# Patient Record
Sex: FEMALE | URBAN - METROPOLITAN AREA
[De-identification: names, ages, dates, MRNs, and addresses within clinical notes are randomized per-mention and may not be internally consistent; named-entity substitution may affect disease eponyms.]

---

## 2023-11-15 ENCOUNTER — APPOINTMENT (OUTPATIENT)
Dept: URBAN - METROPOLITAN AREA CLINIC 306 | Age: 68
Setting detail: DERMATOLOGY
End: 2023-11-15

## 2023-11-15 DIAGNOSIS — L20.89 OTHER ATOPIC DERMATITIS: ICD-10-CM

## 2023-11-15 DIAGNOSIS — L72.8 OTHER FOLLICULAR CYSTS OF THE SKIN AND SUBCUTANEOUS TISSUE: ICD-10-CM

## 2023-11-15 PROCEDURE — OTHER PRESCRIPTION MEDICATION MANAGEMENT: OTHER

## 2023-11-15 PROCEDURE — OTHER GENTLE SKIN CARE INSTRUCTIONS: OTHER

## 2023-11-15 PROCEDURE — OTHER COUNSELING: OTHER

## 2023-11-15 PROCEDURE — OTHER DEFER: OTHER

## 2023-11-15 PROCEDURE — OTHER PRESCRIPTION: OTHER

## 2023-11-15 PROCEDURE — OTHER MIPS QUALITY: OTHER

## 2023-11-15 PROCEDURE — 99204 OFFICE O/P NEW MOD 45 MIN: CPT

## 2023-11-15 RX ORDER — TRIAMCINOLONE ACETONIDE 1 MG/G
CREAM TOPICAL
Qty: 80 | Refills: 6 | Status: ERX | COMMUNITY
Start: 2023-11-15

## 2023-11-15 ASSESSMENT — LOCATION ZONE DERM
LOCATION ZONE: TRUNK
LOCATION ZONE: LEG

## 2023-11-15 ASSESSMENT — LOCATION DETAILED DESCRIPTION DERM
LOCATION DETAILED: LEFT DISTAL PRETIBIAL REGION
LOCATION DETAILED: SUBXIPHOID

## 2023-11-15 ASSESSMENT — LOCATION SIMPLE DESCRIPTION DERM
LOCATION SIMPLE: LEFT PRETIBIAL REGION
LOCATION SIMPLE: ABDOMEN

## 2023-11-15 NOTE — HPI: RASH
What Type Of Note Output Would You Prefer (Optional)?: Standard Output
Is The Patient Presenting As Previously Scheduled?: Yes
How Severe Is Your Rash?: mild
Is This A New Presentation, Or A Follow-Up?: Rash
Additional History: Used fluorouracil on some spots in that location

## 2023-11-15 NOTE — PROCEDURE: DEFER
Introduction Text (Please End With A Colon): The following procedure was deferred:excision
X Size Of Lesion In Cm (Optional): 0
Detail Level: Detailed

## 2023-11-15 NOTE — PROCEDURE: PRESCRIPTION MEDICATION MANAGEMENT
Detail Level: Zone
Initiate Treatment: triamcinolone acetonide 0.1 % topical cream \\nSig: Apply to affected area (eczema)twice daily prn itch
Render In Strict Bullet Format?: No

## 2023-11-15 NOTE — PROCEDURE: MIPS QUALITY
Quality 226: Preventive Care And Screening: Tobacco Use: Screening And Cessation Intervention: Patient screened for tobacco use, is a smoker AND received Cessation Counseling within measurement period or in the six months prior to the measurement period
Detail Level: Simple
Quality 47: Advance Care Plan: Advance Care Planning discussed and documented in the medical record; patient did not wish or was not able to name a surrogate decision maker or provide an advance care plan.

## 2023-12-18 ENCOUNTER — APPOINTMENT (OUTPATIENT)
Dept: URBAN - METROPOLITAN AREA CLINIC 306 | Age: 68
Setting detail: DERMATOLOGY
End: 2023-12-18

## 2023-12-18 DIAGNOSIS — L57.0 ACTINIC KERATOSIS: ICD-10-CM

## 2023-12-18 DIAGNOSIS — L72.8 OTHER FOLLICULAR CYSTS OF THE SKIN AND SUBCUTANEOUS TISSUE: ICD-10-CM

## 2023-12-18 PROCEDURE — OTHER COUNSELING: OTHER

## 2023-12-18 PROCEDURE — OTHER EXCISION: OTHER

## 2023-12-18 PROCEDURE — 11402 EXC TR-EXT B9+MARG 1.1-2 CM: CPT

## 2023-12-18 PROCEDURE — 12031 INTMD RPR S/A/T/EXT 2.5 CM/<: CPT | Mod: 59

## 2023-12-18 PROCEDURE — 17003 DESTRUCT PREMALG LES 2-14: CPT

## 2023-12-18 PROCEDURE — OTHER LIQUID NITROGEN: OTHER

## 2023-12-18 PROCEDURE — 17000 DESTRUCT PREMALG LESION: CPT | Mod: 59

## 2023-12-18 ASSESSMENT — LOCATION DETAILED DESCRIPTION DERM
LOCATION DETAILED: LEFT ELBOW
LOCATION DETAILED: RIGHT DISTAL POSTERIOR UPPER ARM
LOCATION DETAILED: LEFT VENTRAL PROXIMAL FOREARM
LOCATION DETAILED: RIGHT DISTAL DORSAL FOREARM
LOCATION DETAILED: RIGHT ANTERIOR DISTAL UPPER ARM
LOCATION DETAILED: SUBXIPHOID
LOCATION DETAILED: LEFT PROXIMAL POSTERIOR UPPER ARM
LOCATION DETAILED: RIGHT ELBOW
LOCATION DETAILED: LEFT PROXIMAL DORSAL FOREARM
LOCATION DETAILED: LEFT ANTECUBITAL SKIN
LOCATION DETAILED: RIGHT VENTRAL MEDIAL DISTAL FOREARM

## 2023-12-18 ASSESSMENT — LOCATION SIMPLE DESCRIPTION DERM
LOCATION SIMPLE: LEFT FOREARM
LOCATION SIMPLE: LEFT UPPER ARM
LOCATION SIMPLE: RIGHT UPPER ARM
LOCATION SIMPLE: ABDOMEN
LOCATION SIMPLE: RIGHT FOREARM
LOCATION SIMPLE: LEFT ELBOW
LOCATION SIMPLE: LEFT POSTERIOR UPPER ARM
LOCATION SIMPLE: RIGHT ELBOW
LOCATION SIMPLE: RIGHT POSTERIOR UPPER ARM

## 2023-12-18 ASSESSMENT — LOCATION ZONE DERM
LOCATION ZONE: TRUNK
LOCATION ZONE: ARM

## 2023-12-18 NOTE — PROCEDURE: EXCISION
